# Patient Record
Sex: FEMALE | Race: WHITE | NOT HISPANIC OR LATINO | URBAN - METROPOLITAN AREA
[De-identification: names, ages, dates, MRNs, and addresses within clinical notes are randomized per-mention and may not be internally consistent; named-entity substitution may affect disease eponyms.]

---

## 2017-01-04 ENCOUNTER — APPOINTMENT (OUTPATIENT)
Age: 82
Setting detail: DERMATOLOGY
End: 2017-01-04

## 2017-01-04 PROBLEM — C44.311 BASAL CELL CARCINOMA OF SKIN OF NOSE: Status: ACTIVE | Noted: 2017-01-04

## 2017-01-04 PROBLEM — C44.319 BASAL CELL CARCINOMA OF SKIN OF OTHER PARTS OF FACE: Status: ACTIVE | Noted: 2017-01-04

## 2017-01-04 PROCEDURE — OTHER SUPERFICIAL RADIATION TREATMENT: OTHER

## 2017-01-04 PROCEDURE — OTHER FOLLOW UP FOR NEXT VISIT: OTHER

## 2017-01-04 PROCEDURE — OTHER TREATMENT REGIMEN: OTHER

## 2017-01-04 PROCEDURE — 77280 THER RAD SIMULAJ FIELD SMPL: CPT

## 2017-01-04 PROCEDURE — 77401 RADIATION TX DELIVERY SUPFC: CPT

## 2017-01-04 PROCEDURE — G6001 ECHO GUIDANCE RADIOTHERAPY: HCPCS

## 2017-01-04 NOTE — PROCEDURE: TREATMENT REGIMEN
Plan: Patient was evaluated for reaction and response based on current fraction and dose.\\nEvaluation is appropriate and SRT will continue.\\n\\nUltrasound measures an inflammatory infiltrate 0..85mm\\n\\nPatient notes nose bleeds, nasacort recommended
Detail Level: Zone
Plan: Patient was evaluated for reaction and response based on current fraction and dose.\\nEvaluation is appropriate and SRT will continue.\\n\\nUltrasound measures an inflammatory infiltrate to .58mm
Plan: Patient was evaluated for reaction and response based on current fraction and dose.\\nEvaluation is appropriate and SRT will continue.\\n\\nUltrasound measures an inflammatory infiltrate to 0.93mm
Plan: Patient was evaluated for reaction and response based on current fraction and dose.\\nEvaluation is appropriate and SRT will continue.\\n\\nUltrasound measures an inflammatory infiltrate to .40mm

## 2017-01-04 NOTE — PROCEDURE: SUPERFICIAL RADIATION TREATMENT
Simple Simulation Afterword Text Will Be Included With Simple Simulations (Indications............): The patient had a complete consultation regarding all applicable modalities for the treatment of their skin cancer and based on a variety of factors including the type of tumor, size, and location, the relevant medical history as well as local tissue factors, the functional status of the individual, the ability to perform necessary postoperative wound instructions and the need for simultaneous treatments as well as overall wound healing status, it was determined that the patient would begin radiation therapy treatment for skin cancer.  A full simulation and treatment device design was performed including the determination and formulation of appropriate simple and complex devices including lead shield of 0.762 mm thickness to form molded customized shielding to specifically correlate with the lesion size including treatment margin.  The custom lead shield is adequate to accommodate the appropriate applicator and provide adequate shielding around the treatment site.  The specific field applicator, shields, and devices both simple and complex as well as the specific patient setup is outlined below.  The patient was given a full consent for superficial radiation to both verbally and in writing and the full determination of patient's eligibility for treatment and selection is outlined on the patient eligibility and treatment selection form.  The specific superficial radiotherapy prescription was determined and was documented on the superficial radiotherapy prescription form.  A treatment calculation was also performed and documented on the treatment calculation form.  Based on the prescription, the patient was scheduled for a series of fractional treatments.
Custom Shielding Afterword Text Will Not Be Included With Simple Simulations (X X Y Cm............): port to correlate with the lesion size, including treatment margin. The custom lead shield is adequate to accommodate the appropriate applicator and provide adequate shielding around the treatment site. Additional shielding (as noted below) is used to protect sensitive, normal tissues.
Additional Prescription Justification Text: If there is any interruption in treatment exceeding 5 days please see Decay and Dose Adjustment Calculation and complete treatment under Prescription 2.
Time Dose Fractionation (Optional- Include Units If Applicable): 72
Prescription Used: 1
Bill For Dosimetry/Render Decay And Dose Adjustment Calculation In Note: No
Cumulative Dose In Cgy (Optional): 3031.44
Shielding Size (Optional- Include Units): 3 x 3cm
Dose / Tx In Cgy (Optional): 225.9
Intro Statement (Will Not Render If Left Blank): The patient is undergoing superficial radiation therapy for skin cancer and presents for weekly evaluation and management.  Per protocol and as documented on the flow sheet, the patient was questioned as to subjective redness, pruritus, pain, drainage, fatigue, or any other symptoms.  Objectively, the radiation area was evaluated with regards to erythema, atrophy, scale, crusting, erosion, ulceration, edema, purpura, tenderness, warmth, drainage, and any other findings.  The plan was extensively reviewed including the dose, and dosing schedule.  The simulation and clinical setup was also reviewed as was the external and any internal shields and based on this review the appropriateness and sufficiency of treatment was determined. Initiated SRT.
Shielding Size (Optional- Include Units): 2 x 2cm
Port Dimensions-X Axis In Cm: 3
Treatment Time / Fractionation (Optional- Include Units): .34
Patient Positioning: Supine
Functional Status: 1 (ambulatory, light activity)
Daily Fractionated Dose (Optional- Include Units): 255
Total Number Of Fractions Rx 3: 15
Fractionation Number (Evaluation): 5
Cumulative Dose In Cgy (Optional): 2710.8
Treatment Margins In Cm: 0
Fractionation Number: 12
Energy (Optional-Please Include Units): 50kV
Total Dose (Optional-Please Include Units): 2481
Assessment: Appropriate reaction
Treatment Device Design After Initial Simulation Justification (Will Render If Bill For Treatment Devices = Yes): The patient is status post radiation simulation and is evaluated as to the use of additional devices for shielding and placement for radiation therapy.
Dose / Tx In Cgy (Optional): 252.62
Field Size (Applicator): 3.0 cm
Bill For Radiation Treatment: Yes
Simple Simulation Preamble Text Will Be Included With Simple Simulations (.......... Indications): Simple simulation was performed today for the following reasons:
Port Dimensions-X Axis In Cm: 2
Computed Treatment Time In Min (Will Render The Same As Calculated Treatment Time If Left Blank): .30
Total Number Of Fractions: 20
Depth (Optional-Please Include Units): 1.12mm
Functional Status: 0 (fully active)
Detail Level: Detailed
Total Dose (Optional-Please Include Units): 0112
Field Size (Applicator): 4.0 cm
Ssd In Cm (Optional): 13.5
Daily Fractionated Dose (Optional- Include Units): 220
Time Dose Fractionation (Optional- Include Units If Applicable): 86
Custom Shielding Preamble Text Will Not Be Included With Simple Simulations (.......... X X Y Cm): A lead shield of 0.762 mm thickness is utilized to form a molded, custom shield with a
Treatment Margins In Cm: 0.5
Depth (Optional-Please Include Units): 1.83mm
Depth (Optional-Please Include Units): 1.29
Depth (Optional-Please Include Units): 1.08mm

## 2017-01-06 ENCOUNTER — APPOINTMENT (OUTPATIENT)
Age: 82
Setting detail: DERMATOLOGY
End: 2017-01-09

## 2017-01-06 PROBLEM — C44.311 BASAL CELL CARCINOMA OF SKIN OF NOSE: Status: ACTIVE | Noted: 2017-01-06

## 2017-01-06 PROBLEM — C44.319 BASAL CELL CARCINOMA OF SKIN OF OTHER PARTS OF FACE: Status: ACTIVE | Noted: 2017-01-06

## 2017-01-06 PROCEDURE — 77401 RADIATION TX DELIVERY SUPFC: CPT

## 2017-01-06 PROCEDURE — OTHER SUPERFICIAL RADIATION TREATMENT: OTHER

## 2017-01-06 PROCEDURE — 77280 THER RAD SIMULAJ FIELD SMPL: CPT

## 2017-01-06 PROCEDURE — G6001 ECHO GUIDANCE RADIOTHERAPY: HCPCS

## 2017-01-06 PROCEDURE — OTHER TREATMENT REGIMEN: OTHER

## 2017-01-06 NOTE — PROCEDURE: SUPERFICIAL RADIATION TREATMENT
Functional Status: 1 (ambulatory, light activity)
Daily Fractionated Dose (Optional- Include Units): 255
Dimensions-X Axis In Cm: 1
Render Prescriptions In Note?: No
Shielding Size (Optional- Include Units): 2 x 2cm
Energy (Include Units): 50kV
Fractions / Week: 3
Port Dimensions-X Axis In Cm: 2
Assessment: Appropriate reaction
Ssd In Cm (Optional): 13.5
Additional Prescription Justification Text: If there is any interruption in treatment exceeding 5 days please see Decay and Dose Adjustment Calculation and complete treatment under Prescription 2.
Simple Simulation Afterword Text Will Be Included With Simple Simulations (Indications............): The patient had a complete consultation regarding all applicable modalities for the treatment of their skin cancer and based on a variety of factors including the type of tumor, size, and location, the relevant medical history as well as local tissue factors, the functional status of the individual, the ability to perform necessary postoperative wound instructions and the need for simultaneous treatments as well as overall wound healing status, it was determined that the patient would begin radiation therapy treatment for skin cancer.  A full simulation and treatment device design was performed including the determination and formulation of appropriate simple and complex devices including lead shield of 0.762 mm thickness to form molded customized shielding to specifically correlate with the lesion size including treatment margin.  The custom lead shield is adequate to accommodate the appropriate applicator and provide adequate shielding around the treatment site.  The specific field applicator, shields, and devices both simple and complex as well as the specific patient setup is outlined below.  The patient was given a full consent for superficial radiation to both verbally and in writing and the full determination of patient's eligibility for treatment and selection is outlined on the patient eligibility and treatment selection form.  The specific superficial radiotherapy prescription was determined and was documented on the superficial radiotherapy prescription form.  A treatment calculation was also performed and documented on the treatment calculation form.  Based on the prescription, the patient was scheduled for a series of fractional treatments.
Intro Statement (Will Not Render If Left Blank): The patient is undergoing superficial radiation therapy for skin cancer and presents for weekly evaluation and management.  Per protocol and as documented on the flow sheet, the patient was questioned as to subjective redness, pruritus, pain, drainage, fatigue, or any other symptoms.  Objectively, the radiation area was evaluated with regards to erythema, atrophy, scale, crusting, erosion, ulceration, edema, purpura, tenderness, warmth, drainage, and any other findings.  The plan was extensively reviewed including the dose, and dosing schedule.  The simulation and clinical setup was also reviewed as was the external and any internal shields and based on this review the appropriateness and sufficiency of treatment was determined. Initiated SRT.
Fractions / Week Rx 4: 5
Dose Per Fractionation In Cgy (Optional): 252.62
Field Size (Applicator): 4.0 cm
Custom Shielding Preamble Text Will Not Be Included With Simple Simulations (.......... X X Y Cm): A lead shield of 0.762 mm thickness is utilized to form a molded, custom shield with a
Custom Shielding Afterword Text Will Not Be Included With Simple Simulations (X X Y Cm............): port to correlate with the lesion size, including treatment margin. The custom lead shield is adequate to accommodate the appropriate applicator and provide adequate shielding around the treatment site. Additional shielding (as noted below) is used to protect sensitive, normal tissues.
Treatment Time / Fractionation (Optional- Include Units): .30
Total Number Of Fractions: 20
Functional Status: 0 (fully active)
Treatment Device Design After Initial Simulation Justification (Will Render If Bill For Treatment Devices = Yes): The patient is status post radiation simulation and is evaluated as to the use of additional devices for shielding and placement for radiation therapy.
Treatment Time / Fractionation (Optional- Include Units): .34
Simple Simulation Preamble Text Will Be Included With Simple Simulations (.......... Indications): Simple simulation was performed today for the following reasons:
Dose / Tx In Cgy (Optional): 225.9
Total Dose (Optional-Please Include Units): 7928
Total Number Of Fractions Rx 4: 15
Port Dimensions-X Axis In Cm: 0
Field Size (Applicator): 3.0 cm
Bill For Radiation Treatment: Yes
Fractionation Number: 13
Depth (Optional-Please Include Units): 1.29
Time Dose Fractionation (Optional- Include Units If Applicable): 86
Patient Positioning: Supine
Treatment Margins In Cm: 0.5
Depth (Optional-Please Include Units): 1.83mm
Detail Level: Detailed
Daily Fractionated Dose (Optional- Include Units): 220
Cumulative Dose In Cgy (Optional): 2936.7
Total Dose (Optional-Please Include Units): 3892
Shielding Size (Optional- Include Units): 3 x 3cm
Cumulative Dose In Cgy (Optional): 3284.06
Depth (Optional-Please Include Units): 1.08mm
Depth (Optional-Please Include Units): 1.12mm
Time Dose Fractionation (Optional- Include Units If Applicable): 72

## 2017-01-06 NOTE — PROCEDURE: TREATMENT REGIMEN
Detail Level: Zone
Plan: Patient was evaluated for reaction and response based on current fraction and dose.\\nEvaluation is appropriate and SRT will continue.\\n\\nUltrasound measures an inflammatory infiltrate to 0.8mm
Plan: Patient was evaluated for reaction and response based on current fraction and dose.\\nEvaluation is appropriate and SRT will continue.\\n\\nUltrasound measures an inflammatory infiltrate to .5mm
Plan: Patient was evaluated for reaction and response based on current fraction and dose.\\nEvaluation is appropriate and SRT will continue.\\n\\nUltrasound measures an inflammatory infiltrate to .39mm
Plan: Patient was evaluated for reaction and response based on current fraction and dose.\\nEvaluation is appropriate and SRT will continue.\\n\\nUltrasound measures an inflammatory infiltrate 0.85mm\\n\\nPatient notes nose bleeds, nasacort recommended

## 2017-01-09 ENCOUNTER — APPOINTMENT (OUTPATIENT)
Age: 82
Setting detail: DERMATOLOGY
End: 2017-01-10

## 2017-01-09 PROBLEM — C44.319 BASAL CELL CARCINOMA OF SKIN OF OTHER PARTS OF FACE: Status: ACTIVE | Noted: 2017-01-09

## 2017-01-09 PROBLEM — C44.311 BASAL CELL CARCINOMA OF SKIN OF NOSE: Status: ACTIVE | Noted: 2017-01-09

## 2017-01-09 PROCEDURE — OTHER TREATMENT REGIMEN: OTHER

## 2017-01-09 PROCEDURE — OTHER FOLLOW UP FOR NEXT VISIT: OTHER

## 2017-01-09 PROCEDURE — OTHER SUPERFICIAL RADIATION TREATMENT: OTHER

## 2017-01-09 PROCEDURE — G6001 ECHO GUIDANCE RADIOTHERAPY: HCPCS

## 2017-01-09 PROCEDURE — 77401 RADIATION TX DELIVERY SUPFC: CPT

## 2017-01-09 PROCEDURE — 77280 THER RAD SIMULAJ FIELD SMPL: CPT

## 2017-01-09 NOTE — PROCEDURE: TREATMENT REGIMEN
Detail Level: Zone
Plan: Patient was evaluated for reaction and response based on current fraction and dose.\\nEvaluation is appropriate and SRT will continue.\\n\\nUltrasound measures an inflammatory infiltrate 0.7mm\\nPeriodic Epistaxis has subsided
Plan: Patient was evaluated for reaction and response based on current fraction and dose.\\nEvaluation is appropriate and SRT will continue.\\n\\nUltrasound measures an inflammatory infiltrate to .73mm
Plan: Patient was evaluated for reaction and response based on current fraction and dose.\\nEvaluation is appropriate and SRT will continue.\\n\\nUltrasound measures an inflammatory infiltrate to .77mm
Plan: Patient was evaluated based on current dose and fraction for reaction and tolerance.  Patient was appropriate to continue SRT. \\n\\nNo evidence of lesion could be delineated on ultrasound\\n

## 2017-01-09 NOTE — PROCEDURE: SUPERFICIAL RADIATION TREATMENT
Port Dimensions-X Axis In Cm: 2
Simple Simulation Preamble Text Will Be Included With Simple Simulations (.......... Indications): Simple simulation was performed today for the following reasons:
Treatment Time / Fractionation (Optional- Include Units): .34
Fractions / Week: 3
Fractions / Week Rx 3: 5
Depth (Optional-Please Include Units): 1.12mm
Dose Per Fractionation In Cgy (Optional): 252.62
Custom Shielding Afterword Text Will Not Be Included With Simple Simulations (X X Y Cm............): port to correlate with the lesion size, including treatment margin. The custom lead shield is adequate to accommodate the appropriate applicator and provide adequate shielding around the treatment site. Additional shielding (as noted below) is used to protect sensitive, normal tissues.
Number Of Days Off Treatment: 1
Include Rx 4 When Rendering Additional Prescriptions: No
Total Number Of Fractions Rx 3: 15
Additional Prescription Justification Text: If there is any interruption in treatment exceeding 5 days please see Decay and Dose Adjustment Calculation and complete treatment under Prescription 2.
Treatment Time In Min (Optional): .30
Energy (Optional-Please Include Units): 50kV
Shielding Size (Optional- Include Units): 2 x 2cm
Port Dimensions-Y Axis In Cm: 0
Field Size (Applicator): 3.0 cm
Detail Level: Detailed
Field Size (Applicator): 4.0 cm
Bill For Radiation Treatment: Yes
Time Dose Fractionation (Optional- Include Units If Applicable): 86
Simple Simulation Afterword Text Will Be Included With Simple Simulations (Indications............): The patient had a complete consultation regarding all applicable modalities for the treatment of their skin cancer and based on a variety of factors including the type of tumor, size, and location, the relevant medical history as well as local tissue factors, the functional status of the individual, the ability to perform necessary postoperative wound instructions and the need for simultaneous treatments as well as overall wound healing status, it was determined that the patient would begin radiation therapy treatment for skin cancer.  A full simulation and treatment device design was performed including the determination and formulation of appropriate simple and complex devices including lead shield of 0.762 mm thickness to form molded customized shielding to specifically correlate with the lesion size including treatment margin.  The custom lead shield is adequate to accommodate the appropriate applicator and provide adequate shielding around the treatment site.  The specific field applicator, shields, and devices both simple and complex as well as the specific patient setup is outlined below.  The patient was given a full consent for superficial radiation to both verbally and in writing and the full determination of patient's eligibility for treatment and selection is outlined on the patient eligibility and treatment selection form.  The specific superficial radiotherapy prescription was determined and was documented on the superficial radiotherapy prescription form.  A treatment calculation was also performed and documented on the treatment calculation form.  Based on the prescription, the patient was scheduled for a series of fractional treatments.
Shielding Size (Optional- Include Units): 3 x 3cm
Fractionation Number: 14
Total Dose (Optional-Please Include Units): 4879
Total Number Of Fractions: 20
Intro Statement (Will Not Render If Left Blank): The patient is undergoing superficial radiation therapy for skin cancer and presents for weekly evaluation and management.  Per protocol and as documented on the flow sheet, the patient was questioned as to subjective redness, pruritus, pain, drainage, fatigue, or any other symptoms.  Objectively, the radiation area was evaluated with regards to erythema, atrophy, scale, crusting, erosion, ulceration, edema, purpura, tenderness, warmth, drainage, and any other findings.  The plan was extensively reviewed including the dose, and dosing schedule.  The simulation and clinical setup was also reviewed as was the external and any internal shields and based on this review the appropriateness and sufficiency of treatment was determined. Initiated SRT.
Assessment: Appropriate reaction
Functional Status: 1 (ambulatory, light activity)
Treatment Margins In Cm: 0.5
Daily Fractionated Dose (Optional- Include Units): 255
Treatment Device Design After Initial Simulation Justification (Will Render If Bill For Treatment Devices = Yes): The patient is status post radiation simulation and is evaluated as to the use of additional devices for shielding and placement for radiation therapy.
Depth (Optional-Please Include Units): 1.08mm
Depth (Optional-Please Include Units): 1.29
Custom Shielding Preamble Text Will Not Be Included With Simple Simulations (.......... X X Y Cm): A lead shield of 0.762 mm thickness is utilized to form a molded, custom shield with a
Daily Fractionated Dose (Optional- Include Units): 220
Time Dose Fractionation (Optional- Include Units If Applicable): 72
Cumulative Dose In Cgy (Optional): 3536.58
Patient Positioning: Supine
Functional Status: 0 (fully active)
Cumulative Dose In Cgy (Optional): 3536.68
Cumulative Dose In Cgy (Optional): 3162.6
Depth (Optional-Please Include Units): 1.83mm
Dose / Tx In Cgy (Optional): 225.9
Total Dose (Optional-Please Include Units): 3069
Ssd In Cm (Optional): 13.5

## 2017-01-11 ENCOUNTER — APPOINTMENT (OUTPATIENT)
Age: 82
Setting detail: DERMATOLOGY
End: 2017-01-11

## 2017-01-11 PROBLEM — C44.319 BASAL CELL CARCINOMA OF SKIN OF OTHER PARTS OF FACE: Status: ACTIVE | Noted: 2017-01-11

## 2017-01-11 PROBLEM — C44.311 BASAL CELL CARCINOMA OF SKIN OF NOSE: Status: ACTIVE | Noted: 2017-01-11

## 2017-01-11 PROCEDURE — 77280 THER RAD SIMULAJ FIELD SMPL: CPT

## 2017-01-11 PROCEDURE — G6001 ECHO GUIDANCE RADIOTHERAPY: HCPCS

## 2017-01-11 PROCEDURE — 77401 RADIATION TX DELIVERY SUPFC: CPT

## 2017-01-11 PROCEDURE — OTHER FOLLOW UP FOR NEXT VISIT: OTHER

## 2017-01-11 PROCEDURE — OTHER SUPERFICIAL RADIATION TREATMENT: OTHER

## 2017-01-11 PROCEDURE — OTHER TREATMENT REGIMEN: OTHER

## 2017-01-11 NOTE — PROCEDURE: TREATMENT REGIMEN
Plan: Patient was evaluated for reaction and response based on current fraction and dose.\\nEvaluation is appropriate and SRT will continue.\\n\\nUltrasound measures an inflammatory infiltrate to .48mm
Plan: Patient was evaluated for reaction and response based on current fraction and dose.\\nEvaluation is appropriate and SRT will continue.\\n\\nUltrasound measures an inflammatory infiltrate to .69mm
Plan: Patient was evaluated for reaction and response based on current fraction and dose.\\nEvaluation is appropriate and SRT will continue.\\n\\nUltrasound measures an inflammatory infiltrate 1.06mm\\nPeriodic Epistaxis has subsided
Detail Level: Zone
Plan: Patient was evaluated based on current dose and fraction for reaction and tolerance.  Patient was appropriate to continue SRT. \\n\\nInfiltrate measures .64 on Ultrasound

## 2017-01-11 NOTE — PROCEDURE: SUPERFICIAL RADIATION TREATMENT
Patient Positioning: Supine
Port Dimensions-X Axis In Cm: 2
Time Dose Fractionation (Optional- Include Units If Applicable): 86
Fractionation Number: 15
Bill For Dosimetry/Render Decay And Dose Adjustment Calculation In Note: No
Field Size (Applicator): 3.0 cm
Functional Status: 1 (ambulatory, light activity)
Custom Shielding Preamble Text Will Not Be Included With Simple Simulations (.......... X X Y Cm): A lead shield of 0.762 mm thickness is utilized to form a molded, custom shield with a
Assessment: Appropriate reaction
Fractions / Week: 3
Detail Level: Detailed
Prescription Used: 1
Energy (Optional-Please Include Units): 50kV
Shielding Size (Optional- Include Units): 2 x 2cm
Time Dose Fractionation (Optional- Include Units If Applicable): 72
Fractions / Week Rx 4: 5
Total Dose (Optional-Please Include Units): 6604
Initial Radiation Treatment Planning (Will Render If Bill Simulation = Yes): The patient had a complete consultation regarding all applicable modalities for the treatment of their skin cancer and based on a variety of factors including the type of tumor, size, and location, the relevant medical history as well as local tissue factors, the functional status of the individual, the ability to perform necessary postoperative wound instructions and the need for simultaneous treatments as well as overall wound healing status, it was determined that the patient would begin radiation therapy treatment for skin cancer.  A full simulation and treatment device design was performed including the determination and formulation of appropriate simple and complex devices including lead shield of 0.762 mm thickness to form molded customized shielding to specifically correlate with the lesion size including treatment margin.  The custom lead shield is adequate to accommodate the appropriate applicator and provide adequate shielding around the treatment site.  The specific field applicator, shields, and devices both simple and complex as well as the specific patient setup is outlined below.  The patient was given a full consent for superficial radiation to both verbally and in writing and the full determination of patient's eligibility for treatment and selection is outlined on the patient eligibility and treatment selection form.  The specific superficial radiotherapy prescription was determined and was documented on the superficial radiotherapy prescription form.  A treatment calculation was also performed and documented on the treatment calculation form.  Based on the prescription, the patient was scheduled for a series of fractional treatments.
Depth (Optional-Please Include Units): 1.29
Ssd In Cm (Optional): 13.5
Treatment Margins In Cm: 0.5
Daily Fractionated Dose (Optional- Include Units): 255
Total Number Of Fractions: 20
Additional Prescription Justification Text: If there is any interruption in treatment exceeding 5 days please see Decay and Dose Adjustment Calculation and complete treatment under Prescription 2.
Dose Per Fractionation In Cgy (Optional): 225.9
Simple Simulation Preamble Text Will Be Included With Simple Simulations (.......... Indications): Simple simulation was performed today for the following reasons:
Treatment Device Design After Initial Simulation Justification (Will Render If Bill For Treatment Devices = Yes): The patient is status post radiation simulation and is evaluated as to the use of additional devices for shielding and placement for radiation therapy.
Dose / Tx In Cgy (Optional): 252.62
Field Size (Applicator): 4.0 cm
Custom Shielding Afterword Text Will Not Be Included With Simple Simulations (X X Y Cm............): port to correlate with the lesion size, including treatment margin. The custom lead shield is adequate to accommodate the appropriate applicator and provide adequate shielding around the treatment site. Additional shielding (as noted below) is used to protect sensitive, normal tissues.
Treatment Time / Fractionation (Optional- Include Units): .34
Depth (Optional-Please Include Units): 1.12mm
Intro Statement (Will Not Render If Left Blank): The patient is undergoing superficial radiation therapy for skin cancer and presents for weekly evaluation and management.  Per protocol and as documented on the flow sheet, the patient was questioned as to subjective redness, pruritus, pain, drainage, fatigue, or any other symptoms.  Objectively, the radiation area was evaluated with regards to erythema, atrophy, scale, crusting, erosion, ulceration, edema, purpura, tenderness, warmth, drainage, and any other findings.  The plan was extensively reviewed including the dose, and dosing schedule.  The simulation and clinical setup was also reviewed as was the external and any internal shields and based on this review the appropriateness and sufficiency of treatment was determined. Initiated SRT.
Treatment Time / Fractionation (Optional- Include Units): .30
Functional Status: 0 (fully active)
Cumulative Dose In Cgy (Optional): 3789.3
Port Dimensions-X Axis In Cm: 0
Cumulative Dose In Cgy (Optional): 3388.5
Depth (Optional-Please Include Units): 1.83mm
Bill For Radiation Treatment: Yes
Daily Fractionated Dose (Optional- Include Units): 220
Total Dose (Optional-Please Include Units): 1289
Depth (Optional-Please Include Units): 1.08mm
Shielding Size (Optional- Include Units): 3 x 3cm

## 2017-01-13 ENCOUNTER — APPOINTMENT (OUTPATIENT)
Age: 82
Setting detail: DERMATOLOGY
End: 2017-01-16

## 2017-01-13 PROBLEM — C44.319 BASAL CELL CARCINOMA OF SKIN OF OTHER PARTS OF FACE: Status: ACTIVE | Noted: 2017-01-13

## 2017-01-13 PROBLEM — C44.311 BASAL CELL CARCINOMA OF SKIN OF NOSE: Status: ACTIVE | Noted: 2017-01-13

## 2017-01-13 PROCEDURE — 77280 THER RAD SIMULAJ FIELD SMPL: CPT

## 2017-01-13 PROCEDURE — 77427 RADIATION TX MANAGEMENT X5: CPT

## 2017-01-13 PROCEDURE — OTHER TREATMENT REGIMEN: OTHER

## 2017-01-13 PROCEDURE — OTHER SUPERFICIAL RADIATION TREATMENT: OTHER

## 2017-01-13 PROCEDURE — OTHER FOLLOW UP FOR NEXT VISIT: OTHER

## 2017-01-13 PROCEDURE — G6001 ECHO GUIDANCE RADIOTHERAPY: HCPCS

## 2017-01-13 NOTE — PROCEDURE: TREATMENT REGIMEN
Detail Level: Zone
Plan: Patient was evaluated for reaction and response based on current fraction and dose.\\nEvaluation is appropriate and SRT will continue.\\n\\nUltrasound measures an inflammatory infiltrate to .34mm
Plan: Patient was evaluated based on current dose and fraction for reaction and tolerance.  Patient was appropriate to continue SRT. \\n\\nInfiltrate measures .39 on Ultrasound
Plan: Patient was evaluated for reaction and response based on current fraction and dose.\\nEvaluation is appropriate and SRT will continue.\\n\\nUltrasound measures an inflammatory infiltrate to .37mm
Plan: Patient was evaluated for reaction and response based on current fraction and dose.\\nEvaluation is appropriate and SRT will continue.\\n\\nUltrasound measures an inflammatory infiltrate .69mm\\nPeriodic Epistaxis has subsided

## 2017-01-13 NOTE — PROCEDURE: SUPERFICIAL RADIATION TREATMENT
Number Of Days Off Treatment: 1
Cumulative Dose In Cgy (Optional): 3789.3
Custom Shielding Preamble Text Will Not Be Included With Simple Simulations (.......... X X Y Cm): A lead shield of 0.762 mm thickness is utilized to form a molded, custom shield with a
Include Rx 3 When Rendering Additional Prescriptions: No
Dose / Tx In Cgy (Optional): 225.9
Total Number Of Fractions: 20
Treatment Margins In Cm: 0
Functional Status: 1 (ambulatory, light activity)
Total Number Of Fractions Rx 2: 15
Fractions / Week Rx 2: 5
Shielding Size (Optional- Include Units): 2 x 2cm
Treatment Time In Min (Optional): .34
Field Size (Applicator): 3.0 cm
Treatment Device Design After Initial Simulation Justification (Will Render If Bill For Treatment Devices = Yes): The patient is status post radiation simulation and is evaluated as to the use of additional devices for shielding and placement for radiation therapy.
Depth (Optional-Please Include Units): 1.08mm
Energy (Include Units): 50kV
Intro Statement (Will Not Render If Left Blank): The patient is undergoing superficial radiation therapy for skin cancer and presents for weekly evaluation and management.  Per protocol and as documented on the flow sheet, the patient was questioned as to subjective redness, pruritus, pain, drainage, fatigue, or any other symptoms.  Objectively, the radiation area was evaluated with regards to erythema, atrophy, scale, crusting, erosion, ulceration, edema, purpura, tenderness, warmth, drainage, and any other findings.  The plan was extensively reviewed including the dose, and dosing schedule.  The simulation and clinical setup was also reviewed as was the external and any internal shields and based on this review the appropriateness and sufficiency of treatment was determined. Initiated SRT.
Fractions / Week: 3
Dose / Tx In Cgy (Optional): 252.62
Patient Positioning: Supine
Additional Prescription Justification Text: If there is any interruption in treatment exceeding 5 days please see Decay and Dose Adjustment Calculation and complete treatment under Prescription 2.
Field Size (Applicator): 4.0 cm
Daily Fractionated Dose (Optional- Include Units): 220
Port Dimensions-X Axis In Cm: 2
Depth (Optional-Please Include Units): 1.83mm
Detail Level: Detailed
Simple Simulation Preamble Text Will Be Included With Simple Simulations (.......... Indications): Simple simulation was performed today for the following reasons:
Bill And Render Text From Evaluation And Management Tab (Will Bill [id number]): Yes
Time Dose Fractionation (Optional- Include Units If Applicable): 86
Assessment: Appropriate reaction
Cumulative Dose In Cgy (Optional): 3388.5
Daily Fractionated Dose (Optional- Include Units): 255
Initial Radiation Treatment Planning (Will Render If Bill Simulation = Yes): The patient had a complete consultation regarding all applicable modalities for the treatment of their skin cancer and based on a variety of factors including the type of tumor, size, and location, the relevant medical history as well as local tissue factors, the functional status of the individual, the ability to perform necessary postoperative wound instructions and the need for simultaneous treatments as well as overall wound healing status, it was determined that the patient would begin radiation therapy treatment for skin cancer.  A full simulation and treatment device design was performed including the determination and formulation of appropriate simple and complex devices including lead shield of 0.762 mm thickness to form molded customized shielding to specifically correlate with the lesion size including treatment margin.  The custom lead shield is adequate to accommodate the appropriate applicator and provide adequate shielding around the treatment site.  The specific field applicator, shields, and devices both simple and complex as well as the specific patient setup is outlined below.  The patient was given a full consent for superficial radiation to both verbally and in writing and the full determination of patient's eligibility for treatment and selection is outlined on the patient eligibility and treatment selection form.  The specific superficial radiotherapy prescription was determined and was documented on the superficial radiotherapy prescription form.  A treatment calculation was also performed and documented on the treatment calculation form.  Based on the prescription, the patient was scheduled for a series of fractional treatments.
Depth (Optional-Please Include Units): 1.29
Custom Shielding Afterword Text Will Not Be Included With Simple Simulations (X X Y Cm............): port to correlate with the lesion size, including treatment margin. The custom lead shield is adequate to accommodate the appropriate applicator and provide adequate shielding around the treatment site. Additional shielding (as noted below) is used to protect sensitive, normal tissues.
Computed Treatment Time In Min (Will Render The Same As Calculated Treatment Time If Left Blank): .30
Total Dose (Optional-Please Include Units): 9181
Total Dose (Optional-Please Include Units): 2920
Depth (Optional-Please Include Units): 1.12mm
Ssd In Cm (Optional): 13.5
Treatment Margins In Cm: 0.5
Functional Status: 0 (fully active)
Time Dose Fractionation (Optional- Include Units If Applicable): 72
Shielding Size (Optional- Include Units): 3 x 3cm

## 2017-01-16 ENCOUNTER — APPOINTMENT (OUTPATIENT)
Age: 82
Setting detail: DERMATOLOGY
End: 2017-01-17

## 2017-01-16 PROBLEM — C44.311 BASAL CELL CARCINOMA OF SKIN OF NOSE: Status: ACTIVE | Noted: 2017-01-16

## 2017-01-16 PROBLEM — C44.319 BASAL CELL CARCINOMA OF SKIN OF OTHER PARTS OF FACE: Status: ACTIVE | Noted: 2017-01-16

## 2017-01-16 PROCEDURE — 77401 RADIATION TX DELIVERY SUPFC: CPT

## 2017-01-16 PROCEDURE — G6001 ECHO GUIDANCE RADIOTHERAPY: HCPCS

## 2017-01-16 PROCEDURE — OTHER SUPERFICIAL RADIATION TREATMENT: OTHER

## 2017-01-16 PROCEDURE — 77280 THER RAD SIMULAJ FIELD SMPL: CPT

## 2017-01-16 PROCEDURE — OTHER TREATMENT REGIMEN: OTHER

## 2017-01-16 PROCEDURE — OTHER FOLLOW UP FOR NEXT VISIT: OTHER

## 2017-01-16 NOTE — PROCEDURE: TREATMENT REGIMEN
Detail Level: Zone
Plan: Patient was evaluated for reaction and response based on current fraction and dose.\\nEvaluation is appropriate and SRT will continue.\\n\\nUltrasound measures an inflammatory infiltrate to .37mm
Plan: Patient was evaluated based on current dose and fraction for reaction and tolerance.  Patient was appropriate to continue SRT. \\n\\nInfiltrate measures .63 on Ultrasound
Plan: Patient was evaluated for reaction and response based on current fraction and dose.\\nEvaluation is appropriate and SRT will continue.  Erythema is noted\\n\\nUltrasound measures an inflammatory infiltrate .77mm\\nPeriodic Epistaxis has subsided
Plan: Patient was evaluated based on current dose and fraction for reaction and tolerance.  Patient was appropriate to continue SRT. \\n\\nNo evidence of lesion could be delineated on ultrasound

## 2017-01-16 NOTE — PROCEDURE: SUPERFICIAL RADIATION TREATMENT
Total Number Of Fractions Rx 4: 15
Additional Prescription Justification Text: If there is any interruption in treatment exceeding 5 days please see Decay and Dose Adjustment Calculation and complete treatment under Prescription 2.
Port Dimensions-Y Axis In Cm: 2
Custom Shielding Preamble Text Will Not Be Included With Simple Simulations (.......... X X Y Cm): A lead shield of 0.762 mm thickness is utilized to form a molded, custom shield with a
Simple Simulation Preamble Text Will Be Included With Simple Simulations (.......... Indications): Simple simulation was performed today for the following reasons:
Fractions / Week Rx 3: 5
Render Additional Prescriptions In Note?: No
Field Size (Applicator): 4.0 cm
Port Dimensions-X Axis In Cm: 3
Patient Positioning: Supine
Energy (Optional-Please Include Units): 50kV
Depth (Optional-Please Include Units): 1.08mm
Treatment Device Design After Initial Simulation Justification (Will Render If Bill For Treatment Devices = Yes): The patient is status post radiation simulation and is evaluated as to the use of additional devices for shielding and placement for radiation therapy.
Dimensions-X Axis In Cm: 1
Computed Treatment Time In Min (Will Render The Same As Calculated Treatment Time If Left Blank): .34
Field Size (Applicator): 3.0 cm
Dose Per Fractionation In Cgy (Optional): 225.9
Port Dimensions-X Axis In Cm: 0
Depth (Optional-Please Include Units): 1.29
Fractionation Number: 16
Total Dose (Optional-Please Include Units): 2500
Detail Level: Detailed
Simple Simulation Afterword Text Will Be Included With Simple Simulations (Indications............): The patient had a complete consultation regarding all applicable modalities for the treatment of their skin cancer and based on a variety of factors including the type of tumor, size, and location, the relevant medical history as well as local tissue factors, the functional status of the individual, the ability to perform necessary postoperative wound instructions and the need for simultaneous treatments as well as overall wound healing status, it was determined that the patient would begin radiation therapy treatment for skin cancer.  A full simulation and treatment device design was performed including the determination and formulation of appropriate simple and complex devices including lead shield of 0.762 mm thickness to form molded customized shielding to specifically correlate with the lesion size including treatment margin.  The custom lead shield is adequate to accommodate the appropriate applicator and provide adequate shielding around the treatment site.  The specific field applicator, shields, and devices both simple and complex as well as the specific patient setup is outlined below.  The patient was given a full consent for superficial radiation to both verbally and in writing and the full determination of patient's eligibility for treatment and selection is outlined on the patient eligibility and treatment selection form.  The specific superficial radiotherapy prescription was determined and was documented on the superficial radiotherapy prescription form.  A treatment calculation was also performed and documented on the treatment calculation form.  Based on the prescription, the patient was scheduled for a series of fractional treatments.
Custom Shielding Afterword Text Will Not Be Included With Simple Simulations (X X Y Cm............): port to correlate with the lesion size, including treatment margin. The custom lead shield is adequate to accommodate the appropriate applicator and provide adequate shielding around the treatment site. Additional shielding (as noted below) is used to protect sensitive, normal tissues.
Dose / Tx In Cgy (Optional): 252.62
Total Number Of Fractions: 20
Shielding Size (Optional- Include Units): 2 x 2cm
Computed Treatment Time In Min (Will Render The Same As Calculated Treatment Time If Left Blank): .30
Assessment: Appropriate reaction
Time Dose Fractionation (Optional- Include Units If Applicable): 86
Intro Statement (Will Not Render If Left Blank): The patient is undergoing superficial radiation therapy for skin cancer and presents for weekly evaluation and management.  Per protocol and as documented on the flow sheet, the patient was questioned as to subjective redness, pruritus, pain, drainage, fatigue, or any other symptoms.  Objectively, the radiation area was evaluated with regards to erythema, atrophy, scale, crusting, erosion, ulceration, edema, purpura, tenderness, warmth, drainage, and any other findings.  The plan was extensively reviewed including the dose, and dosing schedule.  The simulation and clinical setup was also reviewed as was the external and any internal shields and based on this review the appropriateness and sufficiency of treatment was determined. Initiated SRT.
Bill For Radiation Treatment: Yes
Daily Fractionated Dose (Optional- Include Units): 220
Treatment Margins In Cm: 0.5
Depth (Optional-Please Include Units): 1.12mm
Total Dose (Optional-Please Include Units): 0719
Daily Fractionated Dose (Optional- Include Units): 255
Functional Status: 0 (fully active)
Shielding Size (Optional- Include Units): 3 x 3cm
Functional Status: 1 (ambulatory, light activity)
Time Dose Fractionation (Optional- Include Units If Applicable): 72
Cumulative Dose In Cgy (Optional): 4041.92
Cumulative Dose In Cgy (Optional): 3614.4
Ssd In Cm (Optional): 13.5
Depth (Optional-Please Include Units): 1.83mm

## 2017-01-18 ENCOUNTER — APPOINTMENT (OUTPATIENT)
Age: 82
Setting detail: DERMATOLOGY
End: 2017-01-20

## 2017-01-18 PROBLEM — C44.311 BASAL CELL CARCINOMA OF SKIN OF NOSE: Status: ACTIVE | Noted: 2017-01-18

## 2017-01-18 PROBLEM — C44.319 BASAL CELL CARCINOMA OF SKIN OF OTHER PARTS OF FACE: Status: ACTIVE | Noted: 2017-01-18

## 2017-01-18 PROCEDURE — OTHER SUPERFICIAL RADIATION TREATMENT: OTHER

## 2017-01-18 PROCEDURE — OTHER TREATMENT REGIMEN: OTHER

## 2017-01-18 PROCEDURE — OTHER FOLLOW UP FOR NEXT VISIT: OTHER

## 2017-01-18 PROCEDURE — G6001 ECHO GUIDANCE RADIOTHERAPY: HCPCS

## 2017-01-18 PROCEDURE — 77280 THER RAD SIMULAJ FIELD SMPL: CPT

## 2017-01-18 PROCEDURE — 77401 RADIATION TX DELIVERY SUPFC: CPT

## 2017-01-18 NOTE — PROCEDURE: SUPERFICIAL RADIATION TREATMENT
Number Of Treatment Days: 1
Bill And Render Text From Evaluation And Management Tab (Will Bill 13832): No
Shielding Size (Optional- Include Units): 2 x 2cm
Fractions / Week Rx 3: 5
Custom Shielding Preamble Text Will Not Be Included With Simple Simulations (.......... X X Y Cm): A lead shield of 0.762 mm thickness is utilized to form a molded, custom shield with a
Treatment Device Design After Initial Simulation Justification (Will Render If Bill For Treatment Devices = Yes): The patient is status post radiation simulation and is evaluated as to the use of additional devices for shielding and placement for radiation therapy.
Fractionation Number (Evaluation): 15
Energy (Optional-Please Include Units): 50kV
Assessment: Appropriate reaction
Port Dimensions-Y Axis In Cm: 3
Dimensions-Y Axis In Cm: 2
Field Size (Applicator): 4.0 cm
Bill For Radiation Treatment: Yes
Detail Level: Detailed
Custom Shielding Afterword Text Will Not Be Included With Simple Simulations (X X Y Cm............): port to correlate with the lesion size, including treatment margin. The custom lead shield is adequate to accommodate the appropriate applicator and provide adequate shielding around the treatment site. Additional shielding (as noted below) is used to protect sensitive, normal tissues.
Cumulative Dose In Cgy (Optional): 4294.54
Intro Statement (Will Not Render If Left Blank): The patient is undergoing superficial radiation therapy for skin cancer and presents for weekly evaluation and management.  Per protocol and as documented on the flow sheet, the patient was questioned as to subjective redness, pruritus, pain, drainage, fatigue, or any other symptoms.  Objectively, the radiation area was evaluated with regards to erythema, atrophy, scale, crusting, erosion, ulceration, edema, purpura, tenderness, warmth, drainage, and any other findings.  The plan was extensively reviewed including the dose, and dosing schedule.  The simulation and clinical setup was also reviewed as was the external and any internal shields and based on this review the appropriateness and sufficiency of treatment was determined. Initiated SRT.
Dose Per Fractionation In Cgy (Optional): 252.62
Simple Simulation Afterword Text Will Be Included With Simple Simulations (Indications............): The patient had a complete consultation regarding all applicable modalities for the treatment of their skin cancer and based on a variety of factors including the type of tumor, size, and location, the relevant medical history as well as local tissue factors, the functional status of the individual, the ability to perform necessary postoperative wound instructions and the need for simultaneous treatments as well as overall wound healing status, it was determined that the patient would begin radiation therapy treatment for skin cancer.  A full simulation and treatment device design was performed including the determination and formulation of appropriate simple and complex devices including lead shield of 0.762 mm thickness to form molded customized shielding to specifically correlate with the lesion size including treatment margin.  The custom lead shield is adequate to accommodate the appropriate applicator and provide adequate shielding around the treatment site.  The specific field applicator, shields, and devices both simple and complex as well as the specific patient setup is outlined below.  The patient was given a full consent for superficial radiation to both verbally and in writing and the full determination of patient's eligibility for treatment and selection is outlined on the patient eligibility and treatment selection form.  The specific superficial radiotherapy prescription was determined and was documented on the superficial radiotherapy prescription form.  A treatment calculation was also performed and documented on the treatment calculation form.  Based on the prescription, the patient was scheduled for a series of fractional treatments.
Additional Prescription Justification Text: If there is any interruption in treatment exceeding 5 days please see Decay and Dose Adjustment Calculation and complete treatment under Prescription 2.
Treatment Margins In Cm: 0.5
Depth (Optional-Please Include Units): 1.29
Total Dose (Optional-Please Include Units): 2255
Field Size (Applicator): 3.0 cm
Simple Simulation Preamble Text Will Be Included With Simple Simulations (.......... Indications): Simple simulation was performed today for the following reasons:
Treatment Time / Fractionation (Optional- Include Units): .34
Daily Fractionated Dose (Optional- Include Units): 255
Functional Status: 1 (ambulatory, light activity)
Total Number Of Fractions: 20
Time Dose Fractionation (Optional- Include Units If Applicable): 72
Time Dose Fractionation (Optional- Include Units If Applicable): 86
Treatment Time / Fractionation (Optional- Include Units): .30
Functional Status: 0 (fully active)
Depth (Optional-Please Include Units): 1.08mm
Dose / Tx In Cgy (Optional): 225.9
Fractionation Number: 17
Patient Positioning: Supine
Treatment Margins In Cm: 0
Total Dose (Optional-Please Include Units): 7166
Daily Fractionated Dose (Optional- Include Units): 220
Depth (Optional-Please Include Units): 1.12mm
Ssd In Cm (Optional): 13.5
Shielding Size (Optional- Include Units): 3 x 3cm
Depth (Optional-Please Include Units): 1.83mm

## 2017-01-18 NOTE — PROCEDURE: TREATMENT REGIMEN
Plan: Patient was evaluated based on current dose and fraction for reaction and tolerance.  Patient was appropriate to continue SRT. \\n\\nInfiltrate measures .45 on Ultrasound
Plan: Patient was evaluated for reaction and response based on current fraction and dose.\\nEvaluation is appropriate and SRT will continue.  Erythema is noted\\n\\nUltrasound measures an inflammatory infiltrate .81mm
Detail Level: Zone
Plan: Patient was evaluated for reaction and response based on current fraction and dose.\\nEvaluation is appropriate and SRT will continue.\\n\\nUltrasound measures an inflammatory infiltrate to .49mm
Plan: Patient was evaluated based on current dose and fraction for reaction and tolerance.  Patient was appropriate to continue SRT. \\n\\nInfiltrate measures .5 on Ultrasound

## 2017-01-20 ENCOUNTER — APPOINTMENT (OUTPATIENT)
Age: 82
Setting detail: DERMATOLOGY
End: 2017-01-20

## 2017-01-20 PROBLEM — C44.319 BASAL CELL CARCINOMA OF SKIN OF OTHER PARTS OF FACE: Status: ACTIVE | Noted: 2017-01-20

## 2017-01-20 PROBLEM — C44.311 BASAL CELL CARCINOMA OF SKIN OF NOSE: Status: ACTIVE | Noted: 2017-01-20

## 2017-01-20 PROCEDURE — OTHER FOLLOW UP FOR NEXT VISIT: OTHER

## 2017-01-20 PROCEDURE — OTHER TREATMENT REGIMEN: OTHER

## 2017-01-20 PROCEDURE — 77280 THER RAD SIMULAJ FIELD SMPL: CPT

## 2017-01-20 PROCEDURE — 77401 RADIATION TX DELIVERY SUPFC: CPT

## 2017-01-20 PROCEDURE — OTHER SUPERFICIAL RADIATION TREATMENT: OTHER

## 2017-01-20 PROCEDURE — G6001 ECHO GUIDANCE RADIOTHERAPY: HCPCS

## 2017-01-20 NOTE — PROCEDURE: SUPERFICIAL RADIATION TREATMENT
Treatment Time / Fractionation (Optional- Include Units): .34
Dimensions-Y Axis In Cm: 1
Field Size (Applicator): 4.0 cm
Include Rx 4 When Rendering Additional Prescriptions: No
Dose Per Fractionation In Cgy (Optional): 252.62
Daily Fractionated Dose (Optional- Include Units): 255
Simple Simulation Preamble Text Will Be Included With Simple Simulations (.......... Indications): Simple simulation was performed today for the following reasons:
Patient Positioning: Supine
Fractionation Number: 18
Treatment Margins In Cm: 0
Fractions / Week Rx 2: 5
Dimensions-Y Axis In Cm: 2
Additional Prescription Justification Text: If there is any interruption in treatment exceeding 5 days please see Decay and Dose Adjustment Calculation and complete treatment under Prescription 2.
Treatment Device Design After Initial Simulation Justification (Will Render If Bill For Treatment Devices = Yes): The patient is status post radiation simulation and is evaluated as to the use of additional devices for shielding and placement for radiation therapy.
Detail Level: Detailed
Treatment Margins In Cm: 0.5
Functional Status: 1 (ambulatory, light activity)
Total Dose (Optional-Please Include Units): 2871
Depth (Optional-Please Include Units): 1.08mm
Simple Simulation Afterword Text Will Be Included With Simple Simulations (Indications............): The patient had a complete consultation regarding all applicable modalities for the treatment of their skin cancer and based on a variety of factors including the type of tumor, size, and location, the relevant medical history as well as local tissue factors, the functional status of the individual, the ability to perform necessary postoperative wound instructions and the need for simultaneous treatments as well as overall wound healing status, it was determined that the patient would begin radiation therapy treatment for skin cancer.  A full simulation and treatment device design was performed including the determination and formulation of appropriate simple and complex devices including lead shield of 0.762 mm thickness to form molded customized shielding to specifically correlate with the lesion size including treatment margin.  The custom lead shield is adequate to accommodate the appropriate applicator and provide adequate shielding around the treatment site.  The specific field applicator, shields, and devices both simple and complex as well as the specific patient setup is outlined below.  The patient was given a full consent for superficial radiation to both verbally and in writing and the full determination of patient's eligibility for treatment and selection is outlined on the patient eligibility and treatment selection form.  The specific superficial radiotherapy prescription was determined and was documented on the superficial radiotherapy prescription form.  A treatment calculation was also performed and documented on the treatment calculation form.  Based on the prescription, the patient was scheduled for a series of fractional treatments.
Assessment: Appropriate reaction
Functional Status: 0 (fully active)
Computed Treatment Time In Min (Will Render The Same As Calculated Treatment Time If Left Blank): .30
Total Dose (Optional-Please Include Units): 1345
Fractions / Week: 3
Field Size (Applicator): 3.0 cm
Energy (Include Units): 50kV
Total Number Of Fractions: 20
Time Dose Fractionation (Optional- Include Units If Applicable): 86
Custom Shielding Afterword Text Will Not Be Included With Simple Simulations (X X Y Cm............): port to correlate with the lesion size, including treatment margin. The custom lead shield is adequate to accommodate the appropriate applicator and provide adequate shielding around the treatment site. Additional shielding (as noted below) is used to protect sensitive, normal tissues.
Cumulative Dose In Cgy (Optional): 4547.16
Dose / Tx In Cgy (Optional): 225.9
Shielding Size (Optional- Include Units): 3 x 3cm
Fractionation Number (Evaluation): 15
Bill For Radiation Treatment: Yes
Depth (Optional-Please Include Units): 1.12mm
Depth (Optional-Please Include Units): 1.29
Daily Fractionated Dose (Optional- Include Units): 220
Intro Statement (Will Not Render If Left Blank): The patient is undergoing superficial radiation therapy for skin cancer and presents for weekly evaluation and management.  Per protocol and as documented on the flow sheet, the patient was questioned as to subjective redness, pruritus, pain, drainage, fatigue, or any other symptoms.  Objectively, the radiation area was evaluated with regards to erythema, atrophy, scale, crusting, erosion, ulceration, edema, purpura, tenderness, warmth, drainage, and any other findings.  The plan was extensively reviewed including the dose, and dosing schedule.  The simulation and clinical setup was also reviewed as was the external and any internal shields and based on this review the appropriateness and sufficiency of treatment was determined. Initiated SRT.
Custom Shielding Preamble Text Will Not Be Included With Simple Simulations (.......... X X Y Cm): A lead shield of 0.762 mm thickness is utilized to form a molded, custom shield with a
Depth (Optional-Please Include Units): 1.83mm
Cumulative Dose In Cgy (Optional): 4066.2
Time Dose Fractionation (Optional- Include Units If Applicable): 72
Shielding Size (Optional- Include Units): 2 x 2cm
Ssd In Cm (Optional): 13.5

## 2017-01-20 NOTE — PROCEDURE: TREATMENT REGIMEN
Plan: Patient was evaluated for reaction and response based on current fraction and dose.\\nEvaluation is appropriate and SRT will continue.  Erythema is noted\\n\\nUltrasound:  Although inflammation is present and suffiecent, lesion could not be delineated on todays ultrasound
Detail Level: Zone
Plan: Patient was evaluated based on current dose and fraction for reaction and tolerance.  Patient was appropriate to continue SRT. \\n\\nUltrasound could not delineate lesion
Plan: Patient was evaluated based on current dose and fraction for reaction and tolerance.  Patient was appropriate to continue SRT. \\nUltrasound could not delineate lesion
Plan: Patient was evaluated for reaction and response based on current fraction and dose.\\nEvaluation is appropriate and SRT will continue.\\nUltrasound could not delineate lesion

## 2017-01-23 ENCOUNTER — APPOINTMENT (OUTPATIENT)
Age: 82
Setting detail: DERMATOLOGY
End: 2017-01-24

## 2017-01-23 PROBLEM — C44.311 BASAL CELL CARCINOMA OF SKIN OF NOSE: Status: ACTIVE | Noted: 2017-01-23

## 2017-01-23 PROBLEM — J45.909 UNSPECIFIED ASTHMA, UNCOMPLICATED: Status: ACTIVE | Noted: 2017-01-23

## 2017-01-23 PROBLEM — E78.5 HYPERLIPIDEMIA, UNSPECIFIED: Status: ACTIVE | Noted: 2017-01-23

## 2017-01-23 PROBLEM — C44.319 BASAL CELL CARCINOMA OF SKIN OF OTHER PARTS OF FACE: Status: ACTIVE | Noted: 2017-01-23

## 2017-01-23 PROCEDURE — 77401 RADIATION TX DELIVERY SUPFC: CPT

## 2017-01-23 PROCEDURE — G6001 ECHO GUIDANCE RADIOTHERAPY: HCPCS

## 2017-01-23 PROCEDURE — OTHER TREATMENT REGIMEN: OTHER

## 2017-01-23 PROCEDURE — OTHER SUPERFICIAL RADIATION TREATMENT: OTHER

## 2017-01-23 PROCEDURE — OTHER FOLLOW UP FOR NEXT VISIT: OTHER

## 2017-01-23 PROCEDURE — 77280 THER RAD SIMULAJ FIELD SMPL: CPT

## 2017-01-23 NOTE — PROCEDURE: SUPERFICIAL RADIATION TREATMENT
Port Dimensions-X Axis In Cm: 0
Port Dimensions-Y Axis In Cm: 2
Dimensions-X Axis In Cm: 1
Additional Prescription Justification Text: If there is any interruption in treatment exceeding 5 days please see Decay and Dose Adjustment Calculation and complete treatment under Prescription 2.
Total Number Of Fractions Rx 2: 15
Dose Per Fractionation In Cgy (Optional): 225.9
Field Size (Applicator): 4.0 cm
Render Patient Eligibility And Selection In Note?: No
Patient Positioning: Supine
Total Number Of Fractions: 20
Depth (Optional-Please Include Units): 1.12mm
Shielding Size (Optional- Include Units): 2 x 2cm
Detail Level: Detailed
Depth (Optional-Please Include Units): 1.29
Bill For Radiation Treatment: Yes
Fractionation Number: 19
Energy (Include Units): 50kV
Fractions / Week Rx 3: 5
Functional Status: 0 (fully active)
Assessment: Appropriate reaction
Computed Treatment Time In Min (Will Render The Same As Calculated Treatment Time If Left Blank): .34
Custom Shielding Preamble Text Will Not Be Included With Simple Simulations (.......... X X Y Cm): A lead shield of 0.762 mm thickness is utilized to form a molded, custom shield with a
Simple Simulation Afterword Text Will Be Included With Simple Simulations (Indications............): The patient had a complete consultation regarding all applicable modalities for the treatment of their skin cancer and based on a variety of factors including the type of tumor, size, and location, the relevant medical history as well as local tissue factors, the functional status of the individual, the ability to perform necessary postoperative wound instructions and the need for simultaneous treatments as well as overall wound healing status, it was determined that the patient would begin radiation therapy treatment for skin cancer.  A full simulation and treatment device design was performed including the determination and formulation of appropriate simple and complex devices including lead shield of 0.762 mm thickness to form molded customized shielding to specifically correlate with the lesion size including treatment margin.  The custom lead shield is adequate to accommodate the appropriate applicator and provide adequate shielding around the treatment site.  The specific field applicator, shields, and devices both simple and complex as well as the specific patient setup is outlined below.  The patient was given a full consent for superficial radiation to both verbally and in writing and the full determination of patient's eligibility for treatment and selection is outlined on the patient eligibility and treatment selection form.  The specific superficial radiotherapy prescription was determined and was documented on the superficial radiotherapy prescription form.  A treatment calculation was also performed and documented on the treatment calculation form.  Based on the prescription, the patient was scheduled for a series of fractional treatments.
Field Size (Applicator): 3.0 cm
Fractions / Week: 3
Treatment Margins In Cm: 0.5
Functional Status: 1 (ambulatory, light activity)
Cumulative Dose In Cgy (Optional): 4799.78
Custom Shielding Afterword Text Will Not Be Included With Simple Simulations (X X Y Cm............): port to correlate with the lesion size, including treatment margin. The custom lead shield is adequate to accommodate the appropriate applicator and provide adequate shielding around the treatment site. Additional shielding (as noted below) is used to protect sensitive, normal tissues.
Dose / Tx In Cgy (Optional): 252.62
Computed Treatment Time In Min (Will Render The Same As Calculated Treatment Time If Left Blank): .30
Simple Simulation Preamble Text Will Be Included With Simple Simulations (.......... Indications): Simple simulation was performed today for the following reasons:
Total Dose (Optional-Please Include Units): 4801
Total Dose (Optional-Please Include Units): 0707
Daily Fractionated Dose (Optional- Include Units): 220
Treatment Device Design After Initial Simulation Justification (Will Render If Bill For Treatment Devices = Yes): The patient is status post radiation simulation and is evaluated as to the use of additional devices for shielding and placement for radiation therapy.
Intro Statement (Will Not Render If Left Blank): The patient is undergoing superficial radiation therapy for skin cancer and presents for weekly evaluation and management.  Per protocol and as documented on the flow sheet, the patient was questioned as to subjective redness, pruritus, pain, drainage, fatigue, or any other symptoms.  Objectively, the radiation area was evaluated with regards to erythema, atrophy, scale, crusting, erosion, ulceration, edema, purpura, tenderness, warmth, drainage, and any other findings.  The plan was extensively reviewed including the dose, and dosing schedule.  The simulation and clinical setup was also reviewed as was the external and any internal shields and based on this review the appropriateness and sufficiency of treatment was determined. Initiated SRT.
Shielding Size (Optional- Include Units): 3 x 3cm
Cumulative Dose In Cgy (Optional): 4292.1
Daily Fractionated Dose (Optional- Include Units): 255
Time Dose Fractionation (Optional- Include Units If Applicable): 86
Depth (Optional-Please Include Units): 1.08mm
Ssd In Cm (Optional): 13.5
Depth (Optional-Please Include Units): 1.83mm
Time Dose Fractionation (Optional- Include Units If Applicable): 72

## 2017-01-23 NOTE — PROCEDURE: TREATMENT REGIMEN
Detail Level: Zone
Plan: Patient was evaluated for reaction and response based on current fraction and dose.\\nEvaluation is appropriate and SRT will continue.\\nUltrasound could not delineate lesion
Plan: Patient was evaluated for reaction and response based on current fraction and dose.\\nEvaluation is appropriate and SRT will continue.  Erythema is noted\\n\\nUltrasound:  Although inflammation is present and sufficient (1.1mm), lesion could not be delineated on today's ultrasound
Plan: Patient was evaluated based on current dose and fraction for reaction and tolerance.  Patient was appropriate to continue SRT. \\n\\nUltrasound could not delineate lesion
Plan: Patient was evaluated based on current dose and fraction for reaction and tolerance.  Patient was appropriate to continue SRT. \\nUltrasound could not delineate lesion

## 2017-01-25 ENCOUNTER — APPOINTMENT (OUTPATIENT)
Age: 82
Setting detail: DERMATOLOGY
End: 2017-01-26

## 2017-01-25 PROBLEM — C44.311 BASAL CELL CARCINOMA OF SKIN OF NOSE: Status: ACTIVE | Noted: 2017-01-25

## 2017-01-25 PROBLEM — E78.5 HYPERLIPIDEMIA, UNSPECIFIED: Status: ACTIVE | Noted: 2017-01-25

## 2017-01-25 PROBLEM — C44.319 BASAL CELL CARCINOMA OF SKIN OF OTHER PARTS OF FACE: Status: ACTIVE | Noted: 2017-01-25

## 2017-01-25 PROCEDURE — 77280 THER RAD SIMULAJ FIELD SMPL: CPT

## 2017-01-25 PROCEDURE — 77401 RADIATION TX DELIVERY SUPFC: CPT

## 2017-01-25 PROCEDURE — G6001 ECHO GUIDANCE RADIOTHERAPY: HCPCS

## 2017-01-25 PROCEDURE — OTHER FOLLOW UP FOR NEXT VISIT: OTHER

## 2017-01-25 PROCEDURE — OTHER TREATMENT REGIMEN: OTHER

## 2017-01-25 PROCEDURE — OTHER SUPERFICIAL RADIATION TREATMENT: OTHER

## 2017-01-25 NOTE — PROCEDURE: SUPERFICIAL RADIATION TREATMENT
Dose Per Fractionation In Cgy (Optional): 225.9
Simple Simulation Preamble Text Will Be Included With Simple Simulations (.......... Indications): Simple simulation was performed today for the following reasons:
Total Number Of Fractions Rx 2: 15
Treatment Time / Fractionation (Optional- Include Units): .30
Dimensions-X Axis In Cm: 2
Custom Shielding Afterword Text Will Not Be Included With Simple Simulations (X X Y Cm............): port to correlate with the lesion size, including treatment margin. The custom lead shield is adequate to accommodate the appropriate applicator and provide adequate shielding around the treatment site. Additional shielding (as noted below) is used to protect sensitive, normal tissues.
Fractions / Week Rx 2: 5
Render Prescriptions In Note?: No
Fractionation Number: 20
Shielding Size (Optional- Include Units): 3 x 3cm
Computed Treatment Time In Min (Will Render The Same As Calculated Treatment Time If Left Blank): .34
Port Dimensions-Y Axis In Cm: 0
Simple Simulation Afterword Text Will Be Included With Simple Simulations (Indications............): The patient had a complete consultation regarding all applicable modalities for the treatment of their skin cancer and based on a variety of factors including the type of tumor, size, and location, the relevant medical history as well as local tissue factors, the functional status of the individual, the ability to perform necessary postoperative wound instructions and the need for simultaneous treatments as well as overall wound healing status, it was determined that the patient would begin radiation therapy treatment for skin cancer.  A full simulation and treatment device design was performed including the determination and formulation of appropriate simple and complex devices including lead shield of 0.762 mm thickness to form molded customized shielding to specifically correlate with the lesion size including treatment margin.  The custom lead shield is adequate to accommodate the appropriate applicator and provide adequate shielding around the treatment site.  The specific field applicator, shields, and devices both simple and complex as well as the specific patient setup is outlined below.  The patient was given a full consent for superficial radiation to both verbally and in writing and the full determination of patient's eligibility for treatment and selection is outlined on the patient eligibility and treatment selection form.  The specific superficial radiotherapy prescription was determined and was documented on the superficial radiotherapy prescription form.  A treatment calculation was also performed and documented on the treatment calculation form.  Based on the prescription, the patient was scheduled for a series of fractional treatments.
Additional Prescription Justification Text: If there is any interruption in treatment exceeding 5 days please see Decay and Dose Adjustment Calculation and complete treatment under Prescription 2.
Dose Per Fractionation In Cgy (Optional): 252.62
Prescription Used: 1
Detail Level: Detailed
Assessment: Appropriate reaction
Depth (Optional-Please Include Units): 1.83mm
Daily Fractionated Dose (Optional- Include Units): 255
Cumulative Dose In Cgy (Optional): 6764
Custom Shielding Preamble Text Will Not Be Included With Simple Simulations (.......... X X Y Cm): A lead shield of 0.762 mm thickness is utilized to form a molded, custom shield with a
Functional Status: 1 (ambulatory, light activity)
Field Size (Applicator): 3.0 cm
Functional Status: 0 (fully active)
Time Dose Fractionation (Optional- Include Units If Applicable): 72
Energy (Optional-Please Include Units): 50kV
Patient Positioning: Supine
Shielding Size (Optional- Include Units): 2 x 2cm
Bill For Radiation Treatment: Yes
Daily Fractionated Dose (Optional- Include Units): 220
Time Dose Fractionation (Optional- Include Units If Applicable): 86
Field Size (Applicator): 4.0 cm
Treatment Device Design After Initial Simulation Justification (Will Render If Bill For Treatment Devices = Yes): The patient is status post radiation simulation and is evaluated as to the use of additional devices for shielding and placement for radiation therapy.
Treatment Margins In Cm: 0.5
Port Dimensions-Y Axis In Cm: 3
Intro Statement (Will Not Render If Left Blank): The patient is undergoing superficial radiation therapy for skin cancer and presents for weekly evaluation and management.  Per protocol and as documented on the flow sheet, the patient was questioned as to subjective redness, pruritus, pain, drainage, fatigue, or any other symptoms.  Objectively, the radiation area was evaluated with regards to erythema, atrophy, scale, crusting, erosion, ulceration, edema, purpura, tenderness, warmth, drainage, and any other findings.  The plan was extensively reviewed including the dose, and dosing schedule.  The simulation and clinical setup was also reviewed as was the external and any internal shields and based on this review the appropriateness and sufficiency of treatment was determined. Initiated SRT.
Depth (Optional-Please Include Units): 1.29
Ssd In Cm (Optional): 13.5
Total Dose (Optional-Please Include Units): 2212
Depth (Optional-Please Include Units): 1.08mm
Total Dose (Optional-Please Include Units): 8836
Cumulative Dose In Cgy (Optional): 5052.4
Depth (Optional-Please Include Units): 1.12mm

## 2017-01-25 NOTE — PROCEDURE: FOLLOW UP FOR NEXT VISIT
Detail Level: Detailed
Instructions (Optional): Next patient will return for 2 week follow up and SRT manaagement

## 2017-01-25 NOTE — PROCEDURE: TREATMENT REGIMEN
Detail Level: Zone
Plan: Patient was evaluated based on current dose and fraction for reaction and tolerance.  Patient was appropriate to continue SRT. \\n\\nUltrasound could not delineate lesion
Plan: Patient was evaluated for reaction and response based on current fraction and dose.\\nEvaluation is appropriate and SRT will continue.  Erythema is noted\\n\\nUltrasound:  Although inflammation is present and sufficient (.97mm), lesion could not be delineated on today's ultrasound
Plan: Patient was evaluated based on current dose and fraction for reaction and tolerance.  Patient was appropriate to continue SRT. \\nUltrasound could not delineate lesion
Plan: Patient was evaluated for reaction and response based on current fraction and dose.\\nEvaluation is appropriate and SRT will continue.\\nUltrasound could not delineate lesion

## 2017-02-07 ENCOUNTER — APPOINTMENT (OUTPATIENT)
Age: 82
Setting detail: DERMATOLOGY
End: 2017-02-07

## 2017-02-07 PROBLEM — C44.319 BASAL CELL CARCINOMA OF SKIN OF OTHER PARTS OF FACE: Status: ACTIVE | Noted: 2017-02-07

## 2017-02-07 PROBLEM — C44.311 BASAL CELL CARCINOMA OF SKIN OF NOSE: Status: ACTIVE | Noted: 2017-02-07

## 2017-02-07 PROCEDURE — 77427 RADIATION TX MANAGEMENT X5: CPT

## 2017-02-07 PROCEDURE — G6001 ECHO GUIDANCE RADIOTHERAPY: HCPCS

## 2017-02-07 PROCEDURE — OTHER SUPERFICIAL RADIATION TREATMENT: OTHER

## 2017-02-07 PROCEDURE — OTHER FOLLOW UP FOR NEXT VISIT: OTHER

## 2017-02-07 PROCEDURE — OTHER TREATMENT REGIMEN: OTHER

## 2017-02-07 PROCEDURE — 99214 OFFICE O/P EST MOD 30 MIN: CPT | Mod: 25

## 2017-02-07 NOTE — PROCEDURE: FOLLOW UP FOR NEXT VISIT
Instructions (Optional): Next patient will return for in 6 months and for timely follow ups
Detail Level: Detailed

## 2017-02-07 NOTE — PROCEDURE: TREATMENT REGIMEN
Plan: Patient is back 2 weeks after finishing SRT for evaluation and management of SRT.  The patient was carefully evaluated based on prescription and cumulative dose for reaction and response to radiation.  Evaluation is normal.  Patient is healing well with great aesthetics results and no evidence of cancerous lesion on or around treatment site available on ultrasound and dermoscopy.
Detail Level: Zone
Plan: Patient was evaluated for reaction and response based on current fraction and dose.\\nEvaluation is appropriate and SRT will continue.\\nUltrasound could not delineate lesion

## 2017-02-07 NOTE — PROCEDURE: SUPERFICIAL RADIATION TREATMENT
Treatment Time / Fractionation (Optional- Include Units): .34
Fractions / Week Rx 4: 5
Shielding Size (Optional- Include Units): 2 x 2cm
Additional Prescription Justification Text: If there is any interruption in treatment exceeding 5 days please see Decay and Dose Adjustment Calculation and complete treatment under Prescription 2.
Bill For Dosimetry/Render Treatment Time Calculation In Note: No
Total Number Of Fractions: 20
Total Number Of Fractions Rx 2: 15
Simple Simulation Preamble Text Will Be Included With Simple Simulations (.......... Indications): Simple simulation was performed today for the following reasons:
Initial Radiation Treatment Planning (Will Render If Bill Simulation = Yes): The patient had a complete consultation regarding all applicable modalities for the treatment of their skin cancer and based on a variety of factors including the type of tumor, size, and location, the relevant medical history as well as local tissue factors, the functional status of the individual, the ability to perform necessary postoperative wound instructions and the need for simultaneous treatments as well as overall wound healing status, it was determined that the patient would begin radiation therapy treatment for skin cancer.  A full simulation and treatment device design was performed including the determination and formulation of appropriate simple and complex devices including lead shield of 0.762 mm thickness to form molded customized shielding to specifically correlate with the lesion size including treatment margin.  The custom lead shield is adequate to accommodate the appropriate applicator and provide adequate shielding around the treatment site.  The specific field applicator, shields, and devices both simple and complex as well as the specific patient setup is outlined below.  The patient was given a full consent for superficial radiation to both verbally and in writing and the full determination of patient's eligibility for treatment and selection is outlined on the patient eligibility and treatment selection form.  The specific superficial radiotherapy prescription was determined and was documented on the superficial radiotherapy prescription form.  A treatment calculation was also performed and documented on the treatment calculation form.  Based on the prescription, the patient was scheduled for a series of fractional treatments.
Intro Statement (Will Not Render If Left Blank): The patient is undergoing superficial radiation therapy for skin cancer and presents for weekly evaluation and management.  Per protocol and as documented on the flow sheet, the patient was questioned as to subjective redness, pruritus, pain, drainage, fatigue, or any other symptoms.  Objectively, the radiation area was evaluated with regards to erythema, atrophy, scale, crusting, erosion, ulceration, edema, purpura, tenderness, warmth, drainage, and any other findings.  The plan was extensively reviewed including the dose, and dosing schedule.  The simulation and clinical setup was also reviewed as was the external and any internal shields and based on this review the appropriateness and sufficiency of treatment was determined. Initiated SRT.
Detail Level: Detailed
Fractions / Week: 3
Energy (Optional-Please Include Units): 50kV
Depth (Optional-Please Include Units): 1.83mm
Custom Shielding Preamble Text Will Not Be Included With Simple Simulations (.......... X X Y Cm): A lead shield of 0.762 mm thickness is utilized to form a molded, custom shield with a
Field Size (Applicator): 4.0 cm
Shielding Size (Optional- Include Units): 3 x 3cm
Bill And Render Text From Evaluation And Management Tab (Will Bill [id number]): Yes
Number Of Days Off Treatment: 1
Daily Fractionated Dose (Optional- Include Units): 255
Depth (Optional-Please Include Units): 1.08mm
Patient Positioning: Supine
Total Dose (Optional-Please Include Units): 5092
Time Dose Fractionation (Optional- Include Units If Applicable): 86
Computed Treatment Time In Min (Will Render The Same As Calculated Treatment Time If Left Blank): .30
Treatment Device Design After Initial Simulation Justification (Will Render If Bill For Treatment Devices = Yes): The patient is status post radiation simulation and is evaluated as to the use of additional devices for shielding and placement for radiation therapy.
Dimensions-X Axis In Cm: 2
Dose / Tx In Cgy (Optional): 252.62
Field Size (Applicator): 3.0 cm
Functional Status: 1 (ambulatory, light activity)
Total Dose (Optional-Please Include Units): 9504
Depth (Optional-Please Include Units): 1.29
Cumulative Dose In Cgy (Optional): 5052.4
Assessment: Appropriate reaction
Ssd In Cm (Optional): 13.5
Treatment Margins In Cm: 0.5
Port Dimensions-Y Axis In Cm: 0
Dose / Tx In Cgy (Optional): 225.9
Depth (Optional-Please Include Units): 1.12mm
Time Dose Fractionation (Optional- Include Units If Applicable): 72
Daily Fractionated Dose (Optional- Include Units): 220
Custom Shielding Afterword Text Will Not Be Included With Simple Simulations (X X Y Cm............): port to correlate with the lesion size, including treatment margin. The custom lead shield is adequate to accommodate the appropriate applicator and provide adequate shielding around the treatment site. Additional shielding (as noted below) is used to protect sensitive, normal tissues.
Functional Status: 0 (fully active)
Cumulative Dose In Cgy (Optional): 4147

## 2017-08-07 ENCOUNTER — APPOINTMENT (OUTPATIENT)
Age: 82
Setting detail: DERMATOLOGY
End: 2017-08-07

## 2017-08-07 DIAGNOSIS — Z85.828 PERSONAL HISTORY OF OTHER MALIGNANT NEOPLASM OF SKIN: ICD-10-CM

## 2017-08-07 DIAGNOSIS — L57.8 OTHER SKIN CHANGES DUE TO CHRONIC EXPOSURE TO NONIONIZING RADIATION: ICD-10-CM

## 2017-08-07 DIAGNOSIS — D485 NEOPLASM OF UNCERTAIN BEHAVIOR OF SKIN: ICD-10-CM

## 2017-08-07 PROBLEM — D48.5 NEOPLASM OF UNCERTAIN BEHAVIOR OF SKIN: Status: ACTIVE | Noted: 2017-08-07

## 2017-08-07 PROBLEM — I10 ESSENTIAL (PRIMARY) HYPERTENSION: Status: ACTIVE | Noted: 2017-08-07

## 2017-08-07 PROCEDURE — 11101: CPT

## 2017-08-07 PROCEDURE — 11100: CPT

## 2017-08-07 PROCEDURE — OTHER BIOPSY BY SHAVE METHOD: OTHER

## 2017-08-07 PROCEDURE — OTHER COUNSELING: OTHER

## 2017-08-07 PROCEDURE — 99213 OFFICE O/P EST LOW 20 MIN: CPT | Mod: 25

## 2017-08-07 ASSESSMENT — LOCATION ZONE DERM
LOCATION ZONE: SCALP
LOCATION ZONE: LIP
LOCATION ZONE: FACE

## 2017-08-07 ASSESSMENT — LOCATION DETAILED DESCRIPTION DERM
LOCATION DETAILED: PHILTRUM
LOCATION DETAILED: POSTERIOR MID-PARIETAL SCALP
LOCATION DETAILED: LEFT MID PREAURICULAR CHEEK

## 2017-08-07 ASSESSMENT — LOCATION SIMPLE DESCRIPTION DERM
LOCATION SIMPLE: POSTERIOR SCALP
LOCATION SIMPLE: UPPER LIP
LOCATION SIMPLE: LEFT CHEEK

## 2017-08-07 NOTE — PROCEDURE: BIOPSY BY SHAVE METHOD
Biopsy Method: Double edge Personna blades
Size Of Lesion In Cm: 0
Curettage Text: The wound bed was treated with curettage after the biopsy was performed.
Type Of Destruction Used: Curettage
Anesthesia Type: 1% lidocaine with epinephrine
Render Post-Care Instructions In Note?: no
Hemostasis: Drysol
Detail Level: Detailed
Electrodesiccation Text: The wound bed was treated with electrodesiccation after the biopsy was performed.
Consent: Written consent was obtained and risks were reviewed including but not limited to scarring, infection, bleeding, scabbing, incomplete removal, nerve damage and allergy to anesthesia.
Notification Instructions: Patient will be notified of biopsy results. However, patient instructed to call the office if not contacted within 2 weeks.
Dressing: bandage
Wound Care: Bacitracin
Cryotherapy Text: The wound bed was treated with cryotherapy after the biopsy was performed.
Billing Type: Third-Party Bill
Silver Nitrate Text: The wound bed was treated with silver nitrate after the biopsy was performed.
Biopsy Type: H and E
Electrodesiccation And Curettage Text: The wound bed was treated with electrodesiccation and curettage after the biopsy was performed.
Post-Care Instructions: I reviewed with the patient in detail post-care instructions. Patient is to keep the biopsy site dry overnight, and then apply bacitracin twice daily until healed. Patient may apply hydrogen peroxide soaks to remove any crusting.
Anesthesia Volume In Cc: 0.5